# Patient Record
(demographics unavailable — no encounter records)

---

## 2025-03-11 NOTE — PHYSICAL EXAM
[Well Developed] : well developed [Well Nourished] : well nourished [No Acute Distress] : no acute distress [No Carotid Bruit] : no carotid bruit [Normal S1, S2] : normal S1, S2 [No Murmur] : no murmur [Clear Lung Fields] : clear lung fields [Good Air Entry] : good air entry [No Respiratory Distress] : no respiratory distress  [Normal Gait] : normal gait [No Edema] : no edema [Moves all extremities] : moves all extremities [Normal Speech] : normal speech [Alert and Oriented] : alert and oriented [Normal Conjunctiva] : normal conjunctiva [Normal Venous Pressure] : normal venous pressure [Soft] : abdomen soft [Non Tender] : non-tender [No Rash] : no rash

## 2025-03-11 NOTE — DISCUSSION/SUMMARY
[FreeTextEntry1] : 45 year old male with PMHX of elevated CA score  ( 37 ) and HLD and FMHX of MI ( father 65 yo ) sent in from PCP for cardiac evaluation.   EKG NSR 78 (i interpreted) given FH/calcium score would treat HLD and start lo dose Crestor discussed CTA as has FH premature CAD

## 2025-03-11 NOTE — HISTORY OF PRESENT ILLNESS
[FreeTextEntry1] : 45 year old male with PMHX of elevated CA score  ( 37 ) and HLD and FMHX of MI ( father 65 yo ) sent in from PCP for cardiac evaluation.   Patient had a history of elevated LDL of around 180 late 2023. So, he changed his diet. Currently at . Patient has increased his activity level. He currently runs 2-3 times a week for 30 minutes. He has no chest pains. He gets some out of breath with HR in 170's if he is running fast.   Patient denies any palpitations, shortness of breath at rest, dizziness, falls, syncope or neuro focal deficits.  He has no edema.   Previous Work Up LABS  (01/2025 ) APO B 97  LABS ( 12/2024 ) , HDL 43, , TRI 70, A1c 5.6, Cr1.22, GFR 75  CAC score ( 37 ) 12/2024

## 2025-03-11 NOTE — REVIEW OF SYSTEMS
[Fever] : no fever [Chills] : no chills [SOB] : no shortness of breath [Dyspnea on exertion] : not dyspnea during exertion [Chest Discomfort] : no chest discomfort [Lower Ext Edema] : no extremity edema [Palpitations] : no palpitations [Syncope] : no syncope [Cough] : no cough [Nausea] : no nausea [Vomiting] : no vomiting [Diarrhea] : diarrhea [Dizziness] : no dizziness [Numbness (Hypoesthesia)] : no numbness [Weakness] : no weakness [Speech Disturbance] : no speech disturbance [Easy Bleeding] : no tendency for easy bleeding

## 2025-03-11 NOTE — DISCUSSION/SUMMARY
[FreeTextEntry1] : 45 year old male with PMHX of elevated CA score  ( 37 ) and HLD and FMHX of MI ( father 67 yo ) sent in from PCP for cardiac evaluation.   EKG NSR 78 (i interpreted) given FH/calcium score would treat HLD and start lo dose Crestor discussed CTA as has FH premature CAD